# Patient Record
(demographics unavailable — no encounter records)

---

## 2025-03-24 NOTE — ASSESSMENT
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  Diagnosis discussed with patient  MDP rx sent - r/b/a discussed - instructed how to take medication  Tizanidine rx sent - r/b/a discussed - instructed how to take medication - instructed that medication can make patietn drowsy  Use of cryotherapy discussed Instructed to attend PT  Discussed length of time of PT that it generally takes to treat  Discussed in refractory cases, FLAKO can be warranted  Prognosis uncertain at this time  RTC in 6 weeks    The patient's current medication management of their orthopedic diagnosis was reviewed today: (1) We discussed a comprehensive treatment plan that included pharmaceutical management involving the use of prescription medications. (2) There is a moderate risk of morbidity with further treatment, especially from use of prescription strength medications and possible side effects of these medications which include upset stomach with oral medications, skin reactions to topical medications and cardiac/renal/diabetes issues with long term use. (3) I recommended that the patient follow-up with their medical physician to discuss any significant specific potential issues with long term medication use such as interactions with current medications or with exacerbation of underlying medical comorbidities. (4) The benefits and risks associated with use of injectable, oral or topical, prescription and over the counter anti-inflammatory medications were discussed with the patient. The patient voiced understanding of the risks including but not limited to bleeding, stroke, kidney dysfunction, heart disease, and were referred to the black box warning label for further information.  I am working today under the supervision of Dr. Kapadia and I am following the plan of care of Dr. Kapadia as described by him on this date. Progress note completed by Moira Randhawa PA-C under the supervision of Dr. Kapadia.

## 2025-03-24 NOTE — IMAGING
[Left] : left shoulder [Glenohumeral arthritis] : Glenohumeral arthritis [Right] : right shoulder [There are no fractures, subluxations or dislocations. No significant abnormalities are seen] : There are no fractures, subluxations or dislocations. No significant abnormalities are seen [Degenerative change] : Degenerative change

## 2025-03-24 NOTE — HISTORY OF PRESENT ILLNESS
[5] : 5 [Constant] : constant [Sleep] : sleep [Lying in bed] : lying in bed [de-identified] : 3.24.25 Patient here for bilateral shoulder pain. For the last month, the pain got worse, no injury. Her dominant is her right hand. The pain is worse on the right side.

## 2025-04-11 NOTE — IMAGING
[de-identified] : Left and Right Shoulder Exam:  Inspection: Mild swelling, no ecchymosis, no osei deformity Palpation: Tenderness to palpation globally over shoulder Stability: No instability or tenderness over AC joint Range of motion: ROM guarded by pain; there is pain with strength testing Special testing: Positive Ricci test, positive impingement sign; speeds and yergason negative Motor and sensory intact distally

## 2025-04-11 NOTE — ASSESSMENT
[FreeTextEntry1] : 78yo here for follow-up of b/l adhesive capsulitis   took MDP with significant relief for 8 days but pain has since returned worse has been attending PT  CSI b/l shoulders administered today - tolerated procedure well - post procedure precautions discussed tramadol rx sent - side effects reviewed - instructed how to take medication  having significant night symptoms  Use of cryotherapy discussed Instructed to attend PT  Discussed length of time of PT that it generally takes to treat  Discussed in refractory cases, FLAKO can be warranted  Prognosis uncertain at this time  RTC in 6 weeks    The patient's current medication management of their orthopedic diagnosis was reviewed today: (1) We discussed a comprehensive treatment plan that included pharmaceutical management involving the use of prescription medications. (2) There is a moderate risk of morbidity with further treatment, especially from use of prescription strength medications and possible side effects of these medications which include upset stomach with oral medications, skin reactions to topical medications and cardiac/renal/diabetes issues with long term use. (3) I recommended that the patient follow-up with their medical physician to discuss any significant specific potential issues with long term medication use such as interactions with current medications or with exacerbation of underlying medical comorbidities. (4) The benefits and risks associated with use of injectable, oral or topical, prescription and over the counter anti-inflammatory medications were discussed with the patient. The patient voiced understanding of the risks including but not limited to bleeding, stroke, kidney dysfunction, heart disease, and were referred to the black box warning label for further information.  I am working today under the supervision of Dr. Kapadia and I am following the plan of care of Dr. Kapadia as described by him on this date. Progress note completed by Moira Randhawa PA-C under the supervision of Dr. Kapadia.

## 2025-04-11 NOTE — HISTORY OF PRESENT ILLNESS
[5] : 5 [Constant] : constant [Sleep] : sleep [Lying in bed] : lying in bed [de-identified] : 3.24.25 Patient here for bilateral shoulder pain. For the last month, the pain got worse, no injury. Her dominant is her right hand. The pain is worse on the right side.  04/11/2025: here to follow up on BL shoulder pain. states pain is worsening. states MDP only worked for 8 days. going to PT.

## 2025-04-11 NOTE — PROCEDURE
[FreeTextEntry3] : - Large joint injection was performed of the bilateral subacromial space.  - The indication for this procedure was pain and inflammation. Patient has tried OTC's including aspirin, Ibuprofen, Aleve, etc or prescription NSAIDS, and/or exercises at home and/or physical therapy without satisfactory response, patient had decreased mobility in the joint and the risks benefits, and alternatives have been discussed, and verbal consent was obtained. The site was prepped with alcohol, betadine, ethyl chloride sprayed topically and sterile technique used.  - An injection of Betamethasone 2cc; Marcaine 5cc injected. - Patient was advised to call if redness, pain or fever occur, apply ice for 15 minutes out of every hour for the next 12-24 hours as tolerated and patient was advised to rest the joint(s) for 2 days.  - Ultrasound guidance was indicated for this patient due to prior failure or difficult injection. All ultrasound images have been permanently captured and stored accordingly in our picture archiving and communication system. Visualization of the needle and placement of injection was performed without complication.
[facility] ([phone])/Yes

## 2025-04-25 NOTE — HISTORY OF PRESENT ILLNESS
[5] : 5 [Constant] : constant [Sleep] : sleep [Lying in bed] : lying in bed [de-identified] : 3.24.25 Patient here for bilateral shoulder pain. For the last month, the pain got worse, no injury. Her dominant is her right hand. The pain is worse on the right side.  04/11/2025: here to follow up on BL shoulder pain. states pain is worsening. states MDP only worked for 8 days. going to PT.   04/25/2025: here to follow up on bl shoulder pain. states pain is worse since last visit. no relief w/ csi given last visit. going to PT.

## 2025-04-25 NOTE — IMAGING
[de-identified] : Left Shoulder Exam:  Inspection: Mild swelling, no ecchymosis, no osei deformity Palpation: Tenderness to palpation  globally over shoulder Stability: No instability or tenderness over AC joint Range of motion: ROM guarded by pain; there is pain with strength testing Special testing: Positive Ricci test, positive impingement sign; speeds and yergason negative Motor and sensory intact distally  Right Shoulder Exam:  Inspection: Mild swelling, no ecchymosis, no osei deformity Palpation: Tenderness to palpation  globally over shoulder Stability: No instability or tenderness over AC joint Range of motion: ROM guarded by pain; there is pain with strength testing Special testing: Positive Ricci test, positive impingement sign; speeds and yergason negative Motor and sensory intact distally

## 2025-04-25 NOTE — ASSESSMENT
[FreeTextEntry1] : She remains very guarded and symptomatic with any shoulder movement.  Does not appear to be frozen on exam today.  She has had no relief with the steroid pack the tramadol nor the corticosteroid injections.  I do not have an explanation for the persistent symptoms.  There is mild arthritis noted on x-rays but not significantly enough for this level of pain.  An MRI is warranted to rule out rotator cuff pathology.  Continue PT in the interim.  Continue tramadol 50 mg prescription for maintenance therapy as needed  The patient's current medication management of their orthopedic diagnosis was reviewed today: There is a moderate risk of morbidity with further treatment, especially from use of prescription strength medications and possible side effects of these medications which include upset stomach with oral medications, skin reactions to topical medications and cardiac/renal/diabetes issues with long term use.

## 2025-05-09 NOTE — IMAGING
[de-identified] : Right Shoulder Exam: Inspection: Mild swelling, no ecchymosis, no osei deformity Palpation: Tenderness to palpation globally over shoulder Stability: No instability or tenderness over AC joint Range of motion: ROM guarded by pain; there is pain with strength testing Special testing: Positive Ricci test, positive impingement sign; speeds and yergason negative Motor and sensory intact distally

## 2025-05-09 NOTE — ASSESSMENT
[FreeTextEntry1] : I personally reviewed and interpreted the MRI images in detail.  She does have a full-thickness tear of the supraspinatus as well as significant medial subluxation and injury to the long head of the biceps which explains her persistent pain.  She failed all conservative measures including corticosteroid injection medication as well as a course of physical therapy and has had unrelenting night symptoms and pain with reaching.  There is some very early onset adhesive capsulitis but the main issue seems to be the rotator cuff.  We discussed the risk benefits and alternatives to arthroscopic rotator cuff repair, glenohumeral debridement, subacromial decompression, possible biceps tenotomy in detail.  Postoperative course outlined.  I dispensed the UltraSling needed for surgery today.  Surgery would not be possible without the sling.  The risks and benefits of surgery have been discussed. Risks include but are not limited to bleeding, infection, reaction to anesthesia, injury to blood vessels and nerves, malunion, nonunion, DVT, PE, necessity of repeat surgery, chronic pain, loss of limb and death. The patient understands the risks and agrees with the surgical plan. All questions have been answered.

## 2025-05-09 NOTE — HISTORY OF PRESENT ILLNESS
[5] : 5 [Constant] : constant [Sleep] : sleep [Lying in bed] : lying in bed [de-identified] : 3.24.25 Patient here for bilateral shoulder pain. For the last month, the pain got worse, no injury. Her dominant is her right hand. The pain is worse on the right side.  04/11/2025: here to follow up on BL shoulder pain. states pain is worsening. states MDP only worked for 8 days. going to PT.   04/25/2025: here to follow up on bl shoulder pain. states pain is worse since last visit. no relief w/ csi given last visit. going to PT.   05/09/2025: here to review mri results of the R shoulder

## 2025-05-30 NOTE — ASSESSMENT
[FreeTextEntry1] : DOING WELL DURING THIS FIRST POSTOP VISIT SUTURES REMOVED PRECAUTIONS AND RESTRICTIONS REVIEWED IN DETAIL WE REVIEWED THE INTRAOPERATIVE FINDINGS IN DETAIL (INCLUDING SCOPE PICTURES WHERE APPLICABLE) ALL QUESTIONS ANSWERED Given PT rx

## 2025-05-30 NOTE — HISTORY OF PRESENT ILLNESS
[5] : 5 [Constant] : constant [Sleep] : sleep [Lying in bed] : lying in bed [de-identified] : 3.24.25 Patient here for bilateral shoulder pain. For the last month, the pain got worse, no injury. Her dominant is her right hand. The pain is worse on the right side.  04/11/2025: here to follow up on BL shoulder pain. states pain is worsening. states MDP only worked for 8 days. going to PT.   04/25/2025: here to follow up on bl shoulder pain. states pain is worse since last visit. no relief w/ csi given last visit. going to PT.   05/09/2025: here to review mri results of the R shoulder   05/30/2025: here for her 1st p/o visit for the R shoulder. DOS: 5/20/25 SX: R shoulder Arthroscopic Rotator Cuff Repair, Extensive Glenohumeral Debridement, Subacromial Decompression, Possible Biceps Tenotomy

## 2025-05-30 NOTE — IMAGING
[de-identified] : Shoulder Exam:  No erythema or warmth Clean and dry incisions, sutures in place Shoulder immobilizer in place Limited ROM compatible with recent surgery Motor and sensory intact distally

## 2025-06-27 NOTE — ASSESSMENT
[FreeTextEntry1] : She is now just under 6 weeks from rotator cuff repair doing well.  Advised to discontinue the sling.  Course of physical therapy advised to continue.  Follow-up 6 weeks to reassess.  No lifting.  No risky activity.

## 2025-06-27 NOTE — HISTORY OF PRESENT ILLNESS
[de-identified] : 6/27/2025 pt here for Po #2 rotator cuff surgery RT shoulder on 5/20/2025- reports doing well- PT 2x a week is helping - reports pain and discomfort has spread to left shoulder

## 2025-07-28 NOTE — ASSESSMENT
[FreeTextEntry1] : She is a little over 2 months from rotator cuff repair doing excellent.  Her main complaint is bilateral neck pain with radiation down the arms.  We had discussed referral to our spine team in the past but she was unable to schedule.  A neck MRI is advised and consultation with our spine team.  Explained my specialty.  Continue physical therapy advised for the shoulder follow-up in 6 weeks to reassess.  Medrol Dosepak prescription in the interim to alleviate the radicular complaints.  The patient's current medication management of their orthopedic diagnosis was reviewed today: There is a moderate risk of morbidity with further treatment, especially from use of prescription strength medications and possible side effects of these medications which include upset stomach with oral medications, skin reactions to topical medications and cardiac/renal/diabetes issues with long term use.

## 2025-07-28 NOTE — IMAGING
[de-identified] : Portals healed Forward flexion 150, external rotation 70, internal rotation 40 4/5 supraspinatus and infraspinatus testing

## 2025-07-28 NOTE — HISTORY OF PRESENT ILLNESS
[Left Arm] : left arm [Right Arm] : right arm [7] : 7 [2] : 2 [de-identified] : 6/27/2025 pt here for Po #2 rotator cuff surgery RT shoulder on 5/20/2025- reports doing well- PT 2x a week is helping - reports pain and discomfort has spread to left shoulder  07/28/2025: pain and stiffness in b/l arms and neck. Slight pain in rt shoulder